# Patient Record
Sex: MALE | Race: ASIAN
[De-identification: names, ages, dates, MRNs, and addresses within clinical notes are randomized per-mention and may not be internally consistent; named-entity substitution may affect disease eponyms.]

---

## 2019-08-01 ENCOUNTER — HOSPITAL ENCOUNTER (EMERGENCY)
Dept: HOSPITAL 97 - ER | Age: 24
LOS: 1 days | Discharge: HOME | End: 2019-08-02
Payer: SELF-PAY
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
## 2019-08-01 DIAGNOSIS — S61.412A: Primary | ICD-10-CM
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
## 2019-08-01 PROCEDURE — 0JQK0ZZ REPAIR LEFT HAND SUBCUTANEOUS TISSUE AND FASCIA, OPEN APPROACH: ICD-10-PCS

## 2019-08-01 PROCEDURE — 90471 IMMUNIZATION ADMIN: CPT

## 2019-08-01 PROCEDURE — 90714 TD VACC NO PRESV 7 YRS+ IM: CPT

## 2019-08-01 PROCEDURE — 99284 EMERGENCY DEPT VISIT MOD MDM: CPT

## 2019-08-02 NOTE — EDPHYS
Physician Documentation                                                                           

 [facility]                                                                 

Name: Duke Shepherd                                                                                

Age: 24 yrs                                                                                       

Sex: Male                                                                                         

: 1995                                                                                   

MRN: [id number]                                                                                   

Arrival Date: 2019                                                                          

Time: 23:34                                                                                       

Account#: [id number]                                                                            

Bed 5                                                                                             

Private MD:                                                                                       

ED Physician Ruben Cabrera                                                                      

HPI:                                                                                              

                                                                                             

00:21 This 24 yrs old Male presents to ER via Ambulatory with complaints of Laceration To     kb  

      Hand.                                                                                       

00:21 The patient has a laceration related to: falling from a standing position, occurred     kb  

      beach, and there are no complicating factors. The laceration(s) is(are) located on the      

      inner aspect of left palm. Onset: The symptoms/episode began/occurred just prior to         

      arrival. Associated signs and symptoms: The patient has no apparent associated signs or     

      symptoms. The patient has not experienced similar symptoms in the past. The patient has     

      not recently seen a physician. Pt [place],         

      slipped and fell causing laceration to left hand.                                           

                                                                                                  

Historical:                                                                                       

- Allergies:                                                                                      

                                                                                             

23:48 No Known Allergies;                                                                     fc  

- Home Meds:                                                                                      

23:48 None [Active];                                                                          fc  

- PMHx:                                                                                           

23:48 None;                                                                                   fc  

- PSHx:                                                                                           

23:48 None;                                                                                   fc  

                                                                                                  

- Immunization history:: Last tetanus immunization: unknown.                                      

- Social history:: Smoking status: Patient/guardian denies using tobacco, Patient uses            

  alcohol, occasionally. Patient/guardian denies using street drugs.                              

- Ebola Screening: : Patient negative for fever greater than or equal to 101.5 degrees            

  Fahrenheit, and additional compatible Ebola Virus Disease symptoms Patient denies               

  exposure to infectious person Patient denies travel to an Ebola-affected area in the            

  21 days before illness onset.                                                                   

                                                                                                  

                                                                                                  

ROS:                                                                                              

                                                                                             

00:20 Constitutional: Negative for fever, chills, and weight loss, Cardiovascular: Negative   kb  

      for chest pain, palpitations, and edema, Respiratory: Negative for shortness of breath,     

      cough, wheezing, and pleuritic chest pain, Abdomen/GI: Negative for abdominal pain,         

      nausea, vomiting, diarrhea, and constipation, MS/Extremity: Negative for injury and         

      deformity, Neuro: Negative for headache, weakness, numbness, tingling, and seizure.         

      Skin: Positive for laceration(s), of the inner aspect of left palm.                         

                                                                                                  

Exam:                                                                                             

00:20 Constitutional:  This is a well developed, well nourished patient who is awake, alert,  kb  

      and in no acute distress. Head/Face:  Normocephalic, atraumatic. Chest/axilla:  Normal      

      chest wall appearance and motion.  Nontender with no deformity.  No lesions are             

      appreciated. Cardiovascular:  Regular rate and rhythm with a normal S1 and S2.  No          

      gallops, murmurs, or rubs.  Normal PMI, no JVD.  No pulse deficits. Respiratory:  Lungs     

      have equal breath sounds bilaterally, clear to auscultation and percussion.  No rales,      

      rhonchi or wheezes noted.  No increased work of breathing, no retractions or nasal          

      flaring. Abdomen/GI:  Soft, non-tender, with normal bowel sounds.  No distension or         

      tympany.  No guarding or rebound.  No evidence of tenderness throughout. MS/ Extremity:     

       Pulses equal, no cyanosis.  Neurovascular intact.  Full, normal range of motion.           

      Neuro:  Awake and alert, GCS 15, oriented to person, place, time, and situation.            

      Cranial nerves II-XII grossly intact.  Motor strength 5/5 in all extremities.  Sensory      

      grossly intact.  Cerebellar exam normal.  Normal gait.                                      

00:20 Skin: injury, laceration(s), the wound is approximately  5 cm(s), of the inner aspect       

      of left palm, that can be described as clean, no foreign body, linear, without bleeding.    

                                                                                                  

Vital Signs:                                                                                      

                                                                                             

23:44  / 72; Pulse 77; Resp 18; Temp 99.0(O); Pulse Ox 98% on R/A; Weight 64.41 kg (R); fc  

      Height 5 ft. 9 in. (175.26 cm) (R); Pain 5/10;                                              

                                                                                             

00:22  / 80; Pulse 76; Resp 16; Temp 98.9; Pulse Ox 98% on R/A; Pain 3/10;              ak1 

                                                                                             

23:44 Body Mass Index 20.97 (64.41 kg, 175.26 cm)                                             fc  

                                                                                                  

Laceration:                                                                                       

00:18 Wound Repair of 5cm ( 2.0in ) subcutaneous laceration to inner aspect of left palm.     kb  

      Linear shaped.. Distal neuro/vascular/tendon intact. Anesthesia: Wound infiltrated with     

      5 mls of 1% lidocaine. Wound prep: Extensive cleansing with hibiclenz by me, Wound          

      irrigation with saline by me. Skin closed with 10 5-0 Prolene using simple sutures and      

      sterile technique. Dressed with Neosporin, non-adherent dressing. Patient tolerated         

      well.                                                                                       

                                                                                                  

MDM:                                                                                              

                                                                                             

23:42 Patient medically screened.                                                             kb  

                                                                                             

00:18 Data reviewed: vital signs, nurses notes. Data interpreted: Pulse oximetry: on room air kb  

      is 98 %. Interpretation: normal. Counseling: I had a detailed discussion with the           

      patient and/or guardian regarding: the historical points, exam findings, and any            

      diagnostic results supporting the discharge/admit diagnosis, the need for outpatient        

      follow up, a family practitioner, to return to the emergency department if symptoms         

      worsen or persist or if there are any questions or concerns that arise at home.             

                                                                                                  

Administered Medications:                                                                         

                                                                                             

23:46 Drug: Lidocaine (1 %) 5 mg {Note: at bedside for ERP use.} Route: Infiltration;         ak1 

                                                                                             

00:03 Drug: Tetanus-Diphtheria Toxoid Adult 0.5 ml {: [email or web address]. Exp:         ak1 

      2021. Lot #: [id number]. } Route: IM; Site: right deltoid;                                  

00:28 Follow up: Response: No adverse reaction                                                ak1 

00:03 Drug: Doxycycline 100 mg Route: PO;                                                     ak1 

00:28 Follow up: Response: No adverse reaction                                                ak1 

                                                                                                  

                                                                                                  

Disposition:                                                                                      

19 00:19 Discharged to Home. Impression: Laceration without foreign body of left hand.      

- Condition is Stable.                                                                            

- Discharge Instructions: Laceration Care, Adult, Easy-to-Read.                                   

- Prescriptions for Doxycycline Hyclate 100 mg Oral Tablet - take 1 tablet by ORAL                

  route every 12 hours; 20 tablet.                                                                

- Medication Reconciliation Form, Thank You Letter, Antibiotic Education, Prescription            

  Opioid Use form.                                                                                

- Follow up: Emergency Department; When: As needed; Reason: Worsening of condition.               

  Follow up: Private Physician; When: 2 - 3 days; Reason: Recheck today's complaints,             

  Continuance of care, Re-evaluation by your physician.                                           

                                                                                                  

                                                                                                  

                                                                                                  

Signatures:                                                                                       

Carine Graham, CIRO-C                 XINP-Kalpana Huertas, RN                   RN   Clare Trevino RN                       RN   ak1                                                  

                                                                                                  

Corrections: (The following items were deleted from the chart)                                    

00:27 00:19 2019 00:19 Discharged to Home. Impression: Laceration without foreign body  ak1 

      of left hand. Condition is Stable. Forms are Medication Reconciliation Form, Thank You      

      Letter, Antibiotic Education, Prescription Opioid Use. Follow up: Emergency Department;     

      When: As needed; Reason: Worsening of condition. Follow up: Private Physician; When: 2      

      - 3 days; Reason: Recheck today's complaints, Continuance of care, Re-evaluation by         

      your physician. kb                                                                          

                                                                                                  

**************************************************************************************************

## 2019-08-02 NOTE — ER
Nurse's Notes                                                                                     

 [facility]                                                                 

Name: Duke Shepherd                                                                                

Age: 24 yrs                                                                                       

Sex: Male                                                                                         

: 1995                                                                                   

MRN: [id number]                                                                                   

Arrival Date: 2019                                                                          

Time: 23:34                                                                                       

Account#: [id number]                                                                            

Bed 5                                                                                             

Private MD:                                                                                       

Diagnosis: Laceration without foreign body of left hand                                           

                                                                                                  

Presentation:                                                                                     

                                                                                             

23:44 Presenting complaint: Patient states: that he [place] the [place] and [place] a   

      rock. Now has laceration to the palm of left hand. Also has scrap to left forearm.          

      Transition of care: patient was not received from another setting of care. Complicating     

      Factors: There are no complicating factors for this patient. Onset of symptoms was          

      2019 at 22:30. Risk Assessment: Do you want to hurt yourself or someone          

      else? Patient reports no desire to harm self or others. Initial Sepsis Screen: Does the     

      patient meet any 2 criteria? No. Patient's initial sepsis screen is negative. Does the      

      patient have a suspected source of infection? No. Patient's initial sepsis screen is        

      negative. Care prior to arrival: Bleeding of injury controlled.                             

23:44 Method Of Arrival: Ambulatory                                                             

23:44 Acuity: BANG 4                                                                           fc  

                                                                                                  

Historical:                                                                                       

- Allergies:                                                                                      

23:48 No Known Allergies;                                                                     fc  

- Home Meds:                                                                                      

23:48 None [Active];                                                                          fc  

- PMHx:                                                                                           

23:48 None;                                                                                   fc  

- PSHx:                                                                                           

23:48 None;                                                                                   fc  

                                                                                                  

- Immunization history:: Last tetanus immunization: unknown.                                      

- Social history:: Smoking status: Patient/guardian denies using tobacco, Patient uses            

  alcohol, occasionally. Patient/guardian denies using street drugs.                              

- Ebola Screening: : Patient negative for fever greater than or equal to 101.5 degrees            

  Fahrenheit, and additional compatible Ebola Virus Disease symptoms Patient denies               

  exposure to infectious person Patient denies travel to an Ebola-affected area in the            

  21 days before illness onset.                                                                   

                                                                                                  

                                                                                                  

Screenin:48 Abuse screen: Denies threats or abuse. Nutritional screening: No deficits noted.        fc  

      Tuberculosis screening: No symptoms or risk factors identified. Fall Risk None              

      identified.                                                                                 

                                                                                                  

Assessment:                                                                                       

23:51 General: Appears in no apparent distress. Behavior is calm, cooperative. Pain:          ak1 

      Complains of pain in palm of left hand and inner aspect of left palm. Neuro: No             

      deficits noted. Cardiovascular: No deficits noted. Respiratory: No deficits noted. GI:      

      No signs and/or symptoms were reported involving the gastrointestinal system. : No        

      signs and/or symptoms were reported regarding the genitourinary system. EENT: No signs      

      and/or symptoms were reported regarding the EENT system. Derm: Wound noted palm of left     

      hand and inner aspect of left palm Wound is laceration s/p slip and trip on rocks at        

      the beach while fishing. no bleeding present. Musculoskeletal: Range of motion: intact      

      in all extremities. Injury Description: Laceration sustained to palm of left hand and       

      inner aspect of left palm is clean, contaminated, 0.5 to 2.5 cm long, not bleeding, is      

      bleeding no active bleeding noted.                                                          

                                                                                                  

Vital Signs:                                                                                      

23:44  / 72; Pulse 77; Resp 18; Temp 99.0(O); Pulse Ox 98% on R/A; Weight 64.41 kg (R); fc  

      Height 5 ft. 9 in. (175.26 cm) (R); Pain 5/10;                                              

                                                                                             

00:22  / 80; Pulse 76; Resp 16; Temp 98.9; Pulse Ox 98% on R/A; Pain 3/10;              ak1 

                                                                                             

23:44 Body Mass Index 20.97 (64.41 kg, 175.26 cm)                                               

                                                                                                  

ED Course:                                                                                        

                                                                                             

23:34 Patient arrived in ED.                                                                  ds1 

23:39 Clare Lopez, RN is Primary Nurse.                                                     ak1 

23:42 [facility]                                                        kb  

23:42 Ruben Cabrera MD is Attending Physician.                                             kb  

23:44 Arm band placed on Patient placed in an exam room, on a stretcher.                      fc  

23:47 Triage completed.                                                                       fc  

23:48 Patient has correct armband on for positive identification. Bed in low position. Call     

      light in reach. Pulse ox on. NIBP on.                                                       

23:53 Assist provider with laceration repair on left hand and palm of left hand using         ak1 

      sutures. Set up tray. Performed by Carine MAHMOOD.                                   

                                                                                             

00:22 Patient did not have IV access during this emergency room visit.                        ak1 

                                                                                                  

Administered Medications:                                                                         

                                                                                             

23:46 Drug: Lidocaine (1 %) 5 mg {Note: at bedside for ERP use.} Route: Infiltration;         ak1 

                                                                                             

00:03 Drug: Tetanus-Diphtheria Toxoid Adult 0.5 ml {: "[employer] Exp:         ak1 

      2021. Lot #: [id number]. } Route: IM; Site: right deltoid;                                  

00:28 Follow up: Response: No adverse reaction                                                ak1 

00:03 Drug: Doxycycline 100 mg Route: PO;                                                     ak1 

00:28 Follow up: Response: No adverse reaction                                                ak1 

                                                                                                  

                                                                                                  

Outcome:                                                                                          

00:19 Discharge ordered by MD. dick  

00:22 Discharged to home ambulatory.                                                          ak1 

00:22 Condition: stable                                                                           

00:22 Discharge instructions given to patient, Instructed on discharge instructions, follow       

      up and referral plans. medication usage, wound care, Demonstrated understanding of          

      instructions, follow-up care, medications, Prescriptions given X 1.                         

00:27 Patient left the ED.                                                                    ak1 

                                                                                                  

Signatures:                                                                                       

Carine Graham, TIMOTEO TANNER-Kalpana Huertas RN                   Dionne Ch                                ds1                                                  

Clare Lopez RN                       RN   ak1                                                  

                                                                                                  

**************************************************************************************************

## 2019-08-12 ENCOUNTER — HOSPITAL ENCOUNTER (EMERGENCY)
Dept: HOSPITAL 97 - ER | Age: 24
Discharge: HOME | End: 2019-08-12
Payer: SELF-PAY

## 2019-08-12 DIAGNOSIS — Z48.02: Primary | ICD-10-CM

## 2019-08-12 PROCEDURE — 99281 EMR DPT VST MAYX REQ PHY/QHP: CPT

## 2019-08-12 NOTE — EDPHYS
Physician Documentation                                                                           

 [facility]                                                                 

Name: Neel Shepherd                                                                                

Age: 24 yrs                                                                                       

Sex: Male                                                                                         

: 1995                                                                                   

MRN: [id number]                                                                                   

Arrival Date: 2019                                                                          

Time: 22:08                                                                                       

Account#: [id number]                                                                            

Bed 12                                                                                            

Private MD:                                                                                       

ED Physician Celestine Tee                                                                     

HPI:                                                                                              

                                                                                             

22:36 This 24 yrs old  Male presents to ER via Ambulatory with complaints of Suture      pm1 

      Removal.                                                                                    

22:36 The patient has sutures on the left hand. Previous treatment: The patient was initially pm1 

      treated 10 day(s) ago, the care was rendered at [facility],          

      Treatment type: The patient's original treatment included sutures. Sutures/staples          

      progress: The patient has no c/o's. The wound is well-healing with no redness,              

      swelling, discharge, or dehiscence reported. The patient has not experienced similar        

      symptoms in the past. The patient has not recently seen a physician.                        

                                                                                                  

Historical:                                                                                       

- Allergies:                                                                                      

22:22 No Known Allergies;                                                                     bb  

- Home Meds:                                                                                      

22:22 None [Active];                                                                          bb  

- PMHx:                                                                                           

22:22 None;                                                                                   bb  

- PSHx:                                                                                           

22:22 None;                                                                                   bb  

                                                                                                  

- Immunization history:: Adult Immunizations up to date.                                          

- Social history:: Smoking status: .                                                              

- Ebola Screening: : No symptoms or risks identified at this time.                                

                                                                                                  

                                                                                                  

ROS:                                                                                              

22:36 Constitutional: Negative for fever, chills, and weight loss, Eyes: Negative for injury, pm1 

      pain, redness, and discharge, ENT: Negative for injury, pain, and discharge, Neck:          

      Negative for injury, pain, and swelling, Cardiovascular: Negative for chest pain,           

      palpitations, and edema, Respiratory: Negative for shortness of breath, cough,              

      wheezing, and pleuritic chest pain, Abdomen/GI: Negative for abdominal pain, nausea,        

      vomiting, diarrhea, and constipation, Back: Negative for injury and pain.                   

22:36 MS/Extremity: Negative for injury and deformity.                                            

22:36 Skin: Negative for injury, rash, and discoloration, Neuro: Negative for headache,           

      weakness, numbness, tingling, and seizure.                                                  

                                                                                                  

Exam:                                                                                             

22:36 Constitutional:  This is a well developed, well nourished patient who is awake, alert,  pm1 

      and in no acute distress. Head/Face:  Normocephalic, atraumatic. Chest/axilla:  Normal      

      chest wall appearance and motion.  Nontender with no deformity.  No lesions are             

      appreciated. Cardiovascular:  Regular rate and rhythm with a normal S1 and S2.  No          

      gallops, murmurs, or rubs.  Normal PMI, no JVD.  No pulse deficits. Respiratory:  Lungs     

      have equal breath sounds bilaterally, clear to auscultation and percussion.  No rales,      

      rhonchi or wheezes noted.  No increased work of breathing, no retractions or nasal          

      flaring. Back:  No spinal tenderness.  No costovertebral tenderness.  Full range of         

      motion.                                                                                     

22:36 MS/ Extremity:  Pulses equal, no cyanosis.  Neurovascular intact.  Full, normal range       

      of motion.                                                                                  

22:36 Skin: Appearance: normal except for affected area, Wound recheck: Suture laceration         

      closure: the wound is healing well, the edges are well approximated, no evidence of         

      dehiscence, no drainage, no erythema, no swelling.                                          

22:36 Neuro: Orientation: is normal, Motor: is normal, moves all fours, Sensation: is normal,     

      no obvious gross deficits.                                                                  

                                                                                                  

Vital Signs:                                                                                      

22:22  / 68; Pulse 58; Resp 16 S; Temp 98.5(O); Pulse Ox 97% on R/A; Weight 65.32 kg    bb  

      (R); Height 5 ft. 9 in. (175.26 cm) (R); Pain 0/10;                                         

22:22 Body Mass Index 21.26 (65.32 kg, 175.26 cm)                                             bb  

                                                                                                  

Procedures:                                                                                       

22:36 Suture/Staple removal: Removed 10 sutures, from left hand, site appears well healed,    pm1 

      Patient tolerated well.                                                                     

                                                                                                  

MDM:                                                                                              

22:34 Patient medically screened.                                                             pm1 

22:36 Data reviewed: vital signs. Data interpreted: Pulse oximetry: on room air is 97 %.      pm1 

      Interpretation: normal. Counseling: I had a detailed discussion with the patient and/or     

      guardian regarding: the historical points, exam findings, and any diagnostic results        

      supporting the discharge/admit diagnosis, the need for outpatient follow up, to return      

      to the emergency department if symptoms worsen or persist or if there are any questions     

      or concerns that arise at home.                                                             

                                                                                                  

Administered Medications:                                                                         

No medications were administered                                                                  

                                                                                                  

                                                                                                  

Disposition:                                                                                      

                                                                                             

02:45 Co-signature as Attending Physician, Celestine Tee MD I agree with the assessment and tw4 

      plan of care.                                                                               

                                                                                                  

Disposition:                                                                                      

19 22:36 Discharged to Home. Impression: Encounter for removal of sutures.                  

- Condition is Stable.                                                                            

- Discharge Instructions: Suture Removal, Care After.                                             

                                                                                                  

- Medication Reconciliation Form, Thank You Letter, Antibiotic Education, Prescription            

  Opioid Use form.                                                                                

- Follow up: Emergency Department; When: As needed; Reason: Worsening of condition.               

  Follow up: Private Physician; When: As needed; Reason: Recheck today's complaints,              

  Continuance of care, Re-evaluation by your physician.                                           

- Problem is new.                                                                                 

- Symptoms have improved.                                                                         

                                                                                                  

                                                                                                  

                                                                                                  

Signatures:                                                                                       

Ann Marie Kendall RN                     RN   bb                                                   

Artie Raymond NP                    NP   pm1                                                  

Celestine Tee MD MD   tw4                                                  

                                                                                                  

Corrections: (The following items were deleted from the chart)                                    

                                                                                             

22:48 22:36 2019 22:36 Discharged to Home. Impression: Encounter for removal of         bb  

      sutures. Condition is Stable. Forms are Medication Reconciliation Form, Thank You           

      Letter, Antibiotic Education, Prescription Opioid Use. Follow up: Emergency Department;     

      When: As needed; Reason: Worsening of condition. Follow up: Private Physician; When: As     

      needed; Reason: Recheck today's complaints, Continuance of care, Re-evaluation by your      

      physician. Problem is new. Symptoms have improved. pm1                                      

                                                                                                  

**************************************************************************************************

## 2019-08-12 NOTE — ER
Nurse's Notes                                                                                     

 [facility]                                                                 

Name: Neel Shepherd                                                                                

Age: 24 yrs                                                                                       

Sex: Male                                                                                         

: 1995                                                                                   

MRN: [id number]                                                                                   

Arrival Date: 2019                                                                          

Time: 22:08                                                                                       

Account#: [id number]                                                                            

Bed 12                                                                                            

Private MD:                                                                                       

Diagnosis: Encounter for removal of sutures                                                       

                                                                                                  

Presentation:                                                                                     

                                                                                             

22:21 Presenting complaint: Patient states: here for suture removal from left hand.           bb  

      Transition of care: patient was not received from another setting of care. Onset of         

      symptoms was 2019. Risk Assessment: Do you want to hurt yourself or someone       

      else? Patient reports no desire to harm self or others. Initial Sepsis Screen: Does the     

      patient meet any 2 criteria? No. Patient's initial sepsis screen is negative. Does the      

      patient have a suspected source of infection? No. Patient's initial sepsis screen is        

      negative. Care prior to arrival: None.                                                      

22:21 Method Of Arrival: Ambulatory                                                           bb  

22:21 Acuity: BANG 5                                                                           bb  

                                                                                                  

Triage Assessment:                                                                                

22:22 General: Appears in no apparent distress. Behavior is calm, cooperative. Pain: Denies   bb  

      pain. Cardiovascular: No deficits noted. Respiratory: Respiratory effort is even,           

      unlabored, Respiratory pattern is regular. GI: No deficits noted. No signs and/or           

      symptoms were reported involving the gastrointestinal system. Derm: Skin is pink, warm      

      \T\ dry. sutures to left hand well approximated, well healed. Musculoskeletal:              

      Circulation, motion, and sensation intact.                                                  

                                                                                                  

Historical:                                                                                       

- Allergies:                                                                                      

22:22 No Known Allergies;                                                                     bb  

- Home Meds:                                                                                      

22:22 None [Active];                                                                          bb  

- PMHx:                                                                                           

22:22 None;                                                                                   bb  

- PSHx:                                                                                           

22:22 None;                                                                                   bb  

                                                                                                  

- Immunization history:: Adult Immunizations up to date.                                          

- Social history:: Smoking status: .                                                              

- Ebola Screening: : No symptoms or risks identified at this time.                                

                                                                                                  

                                                                                                  

Screenin:48 Abuse screen: Denies threats or abuse. Nutritional screening: No deficits noted.        bb  

      Tuberculosis screening: No symptoms or risk factors identified. Fall Risk None              

      identified.                                                                                 

                                                                                                  

Assessment:                                                                                       

22:47 Reassessment: pt discharged from triage.                                                bb  

                                                                                                  

Vital Signs:                                                                                      

22:22  / 68; Pulse 58; Resp 16 S; Temp 98.5(O); Pulse Ox 97% on R/A; Weight 65.32 kg    bb  

      (R); Height 5 ft. 9 in. (175.26 cm) (R); Pain 0/10;                                         

22:22 Body Mass Index 21.26 (65.32 kg, 175.26 cm)                                             katie  

                                                                                                  

ED Course:                                                                                        

22:08 Patient arrived in ED.                                                                  am2 

22:22 Triage completed.                                                                       bb  

22:22 Arm band placed on Patient pt waiting in triage for suture removal.                     bb  

22:29 Artie Raymond NP is PHCP.                                                           pm1 

22:29 Celestine Tee MD is Attending Physician.                                            pm1 

22:47 Ann Marie Kendall RN is Primary Nurse.                                                   bb  

22:48 Patient has correct armband on for positive identification.                             bb  

22:48 Patient did not have IV access during this emergency room visit.                        bb  

22:48 No provider procedures requiring assistance completed.                                  bb  

                                                                                                  

Administered Medications:                                                                         

No medications were administered                                                                  

                                                                                                  

                                                                                                  

Outcome:                                                                                          

22:36 Discharge ordered by MD.                                                                pm1 

22:48 Discharged to home ambulatory.                                                          bb  

22:48 Condition: stable                                                                           

22:48 Discharge instructions given to patient, Instructed on discharge instructions, follow       

      up and referral plans.                                                                      

22:49 Patient left the ED.                                                                    bb  

                                                                                                  

Signatures:                                                                                       

Ann Marie Kendall, MATTHIAS                     RN   bb                                                   

Artie Raymond NP                    NP   pm1                                                  

Alis Moss                               am2                                                  

                                                                                                  

**************************************************************************************************